# Patient Record
Sex: MALE | Race: WHITE | NOT HISPANIC OR LATINO | Employment: FULL TIME | URBAN - METROPOLITAN AREA
[De-identification: names, ages, dates, MRNs, and addresses within clinical notes are randomized per-mention and may not be internally consistent; named-entity substitution may affect disease eponyms.]

---

## 2022-08-25 ENCOUNTER — OFFICE VISIT (OUTPATIENT)
Dept: INTERNAL MEDICINE | Facility: CLINIC | Age: 32
End: 2022-08-25
Payer: OTHER MISCELLANEOUS

## 2022-08-25 ENCOUNTER — HOSPITAL ENCOUNTER (OUTPATIENT)
Dept: RADIOLOGY | Facility: HOSPITAL | Age: 32
Discharge: HOME | End: 2022-08-25
Attending: PHYSICIAN ASSISTANT
Payer: OTHER MISCELLANEOUS

## 2022-08-25 VITALS
OXYGEN SATURATION: 97 % | HEIGHT: 64 IN | DIASTOLIC BLOOD PRESSURE: 70 MMHG | SYSTOLIC BLOOD PRESSURE: 130 MMHG | RESPIRATION RATE: 12 BRPM | WEIGHT: 185 LBS | HEART RATE: 71 BPM | TEMPERATURE: 98 F | BODY MASS INDEX: 31.58 KG/M2

## 2022-08-25 DIAGNOSIS — Z02.6 ENCOUNTER RELATED TO WORKER'S COMPENSATION CLAIM: ICD-10-CM

## 2022-08-25 DIAGNOSIS — S93.401A INVERSION SPRAIN OF RIGHT ANKLE, INITIAL ENCOUNTER: Primary | ICD-10-CM

## 2022-08-25 DIAGNOSIS — M25.571 ACUTE RIGHT ANKLE PAIN: ICD-10-CM

## 2022-08-25 PROCEDURE — 73610 X-RAY EXAM OF ANKLE: CPT | Mod: TC,PN,RT

## 2022-08-25 PROCEDURE — 80305 DRUG TEST PRSMV DIR OPT OBS: CPT | Mod: QW,S$GLB,, | Performed by: PHYSICIAN ASSISTANT

## 2022-08-25 PROCEDURE — 99204 PR OFFICE/OUTPT VISIT, NEW, LEVL IV, 45-59 MIN: ICD-10-PCS | Mod: S$GLB,,, | Performed by: PHYSICIAN ASSISTANT

## 2022-08-25 PROCEDURE — 73610 XR ANKLE COMPLETE 3 VIEW RIGHT: ICD-10-PCS | Mod: 26,RT,, | Performed by: RADIOLOGY

## 2022-08-25 PROCEDURE — 99204 OFFICE O/P NEW MOD 45 MIN: CPT | Mod: S$GLB,,, | Performed by: PHYSICIAN ASSISTANT

## 2022-08-25 PROCEDURE — 80305 OOH COLLECTION ONLY DRUG SCREEN: ICD-10-PCS | Mod: QW,S$GLB,, | Performed by: PHYSICIAN ASSISTANT

## 2022-08-25 PROCEDURE — 73610 X-RAY EXAM OF ANKLE: CPT | Mod: 26,RT,, | Performed by: RADIOLOGY

## 2022-08-25 RX ORDER — ATORVASTATIN CALCIUM 10 MG/1
10 TABLET, FILM COATED ORAL DAILY
COMMUNITY

## 2022-08-25 NOTE — PROGRESS NOTES
Subjective:       Patient ID: Eliseo Jerry is a 32 y.o. male.    Chief Complaint: Ankle Injury (Right ankle )    NV, DOI: 8/25/2022   SABIC     32-year-old male who was working this morning when he stepped off a platform at 7:00 a.m., twisting his right ankle inward.  Did not feel a pop or crack but had immediate swelling and cannot bear weight at this time.  Has swelling to outer ankle but has pain to outer and inner ankle.  Limited range of motion on flexion-extension of ankle.  No open wounds.  Pain rating is 2/10 at rest without weight bearing and 10/10 when he tries to bear weight.  Had no pain prior to incident today.      Constitution: Negative. Negative for chills, sweating, fatigue and fever.   HENT: Negative.    Neck: neck negative.   Cardiovascular: Negative.  Negative for chest pain.   Eyes: Negative.    Respiratory: Negative.  Negative for chest tightness and shortness of breath.    Gastrointestinal: Negative.    Endocrine: negative.   Genitourinary: Negative.    Musculoskeletal: Positive for pain (R ankle bilateral malleolus pain), joint pain, joint swelling, abnormal ROM of joint (limited plantarflex and dorsiflex due to swelling and pain) and pain with walking (unable to bear weight with ambulation). Negative for trauma.   Skin: Positive for erythema and bruising. Negative for rash and hives.   Allergic/Immunologic: Negative.  Negative for environmental allergies, hives, itching and sneezing.   Neurological: Negative.  Negative for dizziness, light-headedness, disorientation, altered mental status, numbness and tingling.   Hematologic/Lymphatic: Negative.    Psychiatric/Behavioral: Negative.  Negative for altered mental status, disorientation, confusion, agitation and nervous/anxious. The patient is not nervous/anxious.         Objective:      Physical Exam  Vitals and nursing note reviewed.   Constitutional:       General: He is awake. He is not in acute distress.     Appearance: Normal  appearance. He is well-developed and normal weight. He is not ill-appearing, toxic-appearing or diaphoretic.   HENT:      Head: Normocephalic and atraumatic.      Right Ear: External ear normal.      Left Ear: External ear normal.      Nose: Nose normal.      Mouth/Throat:      Mouth: Mucous membranes are moist.   Eyes:      Conjunctiva/sclera: Conjunctivae normal.      Pupils: Pupils are equal, round, and reactive to light.   Cardiovascular:      Rate and Rhythm: Normal rate and regular rhythm.      Pulses: Normal pulses.           Dorsalis pedis pulses are 2+ on the right side and 2+ on the left side.        Posterior tibial pulses are 2+ on the right side and 2+ on the left side.      Heart sounds: Normal heart sounds.   Pulmonary:      Effort: Pulmonary effort is normal.      Breath sounds: Normal breath sounds.   Musculoskeletal:      Cervical back: Normal range of motion and neck supple.      Right foot: Decreased range of motion.        Feet:    Feet:      Right foot:      Skin integrity: Erythema present. No ulcer, blister, skin breakdown, warmth or callus.      Toenail Condition: Right toenails are normal.      Left foot:      Skin integrity: Skin integrity normal.   Skin:     General: Skin is warm and dry.      Findings: Erythema present.   Neurological:      Mental Status: He is alert.   Psychiatric:         Behavior: Behavior is cooperative.        XR ANKLE COMPLETE 3 VIEW RIGHT    Result Date: 8/25/2022  EXAMINATION: XR ANKLE COMPLETE 3 VIEW RIGHT CLINICAL HISTORY: Pain in right ankle and joints of right foot TECHNIQUE: AP, lateral, and oblique images of the right ankle were performed. COMPARISON: None FINDINGS: There is soft tissue swelling at the ankle, particularly laterally.  There is no evidence of a fracture.  The ankle mortise is intact.  A heel spur is noted.     Soft tissue swelling with no acute bony abnormality. Electronically signed by: Bella Marmolejo Date:    08/25/2022  Time:    09:04     Assessment:       1. Inversion sprain of right ankle, initial encounter    2. Acute right ankle pain    3. Encounter related to worker's compensation claim        Plan:       Utilize OTC ibuprofen t.i.d. for discomfort.  Will follow-up in 6 days for reevaluation to determine if there is significant pain, at that time will determine if further referral or treatment is needed.  Patient educated if improvement and swelling goes down with ice over next 48-72 hours he can take splint off and start to wean himself out of it.  Patient verbalized understanding of all education treatment plans     Patient Instructions: Keep dressing clean/dry/covered, Use splint as directed, Elevated affected area, Attention not to aggravate affected area, Apply ice 24-48 hours then apply heat/warm soaks, Use Crutches as directed   Restrictions: Avoid frequent bending/lifting/twisting, No lifting/pushing/pulling more than 10 lbs, No Prolonged standing/walking, Sit or stand as needed, Avoid climbing/kneeling/squatting  Follow up in about 6 days (around 8/31/2022).      There are no Patient Instructions on file for this visit.      JAZMINE Castillo

## 2022-08-25 NOTE — LETTER
[facility]  [address] [place] [address]  Phone: [phone]  Fax: [phone]  [facility] [employer] [facility]: [id number]    Pt Name: Eliseo Esposito Date: 08/25/2022   Employee ID:  Date of First Treatment: 08/25/2022   Company: [employer]      Appointment Time: 08:05 AM Arrived: 7:45am   Provider: JAZMINE Castillo Time Out: 9:35am     Office Treatment:   1. Inversion sprain of right ankle, initial encounter    2. Acute right ankle pain    3. Encounter related to worker's compensation claim          Patient Instructions: Keep dressing clean/dry/covered, Use splint as directed, Elevated affected area, Attention not to aggravate affected area, Apply ice 24-48 hours then apply heat/warm soaks, Use Crutches as directed    Restrictions: Avoid frequent bending/lifting/twisting, No lifting/pushing/pulling more than 10 lbs, No Prolonged standing/walking, Sit or stand as needed, Avoid climbing/kneeling/squatting     Return Appointment: 8/31/2022 at 8:00am

## 2022-08-31 ENCOUNTER — OFFICE VISIT (OUTPATIENT)
Dept: INTERNAL MEDICINE | Facility: CLINIC | Age: 32
End: 2022-08-31
Payer: OTHER MISCELLANEOUS

## 2022-08-31 VITALS
HEIGHT: 64 IN | TEMPERATURE: 97 F | RESPIRATION RATE: 12 BRPM | HEART RATE: 81 BPM | BODY MASS INDEX: 31.58 KG/M2 | OXYGEN SATURATION: 97 % | SYSTOLIC BLOOD PRESSURE: 146 MMHG | WEIGHT: 185 LBS | DIASTOLIC BLOOD PRESSURE: 79 MMHG

## 2022-08-31 DIAGNOSIS — M25.571 ACUTE RIGHT ANKLE PAIN: ICD-10-CM

## 2022-08-31 DIAGNOSIS — Z02.6 ENCOUNTER RELATED TO WORKER'S COMPENSATION CLAIM: ICD-10-CM

## 2022-08-31 DIAGNOSIS — S93.401A INVERSION SPRAIN OF RIGHT ANKLE, INITIAL ENCOUNTER: Primary | ICD-10-CM

## 2022-08-31 PROCEDURE — 99214 OFFICE O/P EST MOD 30 MIN: CPT | Mod: S$GLB,,, | Performed by: PHYSICIAN ASSISTANT

## 2022-08-31 PROCEDURE — 99214 PR OFFICE/OUTPT VISIT, EST, LEVL IV, 30-39 MIN: ICD-10-PCS | Mod: S$GLB,,, | Performed by: PHYSICIAN ASSISTANT

## 2022-08-31 NOTE — PROGRESS NOTES
Subjective:       Patient ID: Eliseo Jerry is a 32 y.o. male.    Chief Complaint: Ankle Injury    RV, DOI: 8/25/2022   SABIC     32-year-old male here for follow-up from right ankle.  Weaned himself out of the CAM walker 3 days ago, able to walk and bear weight without significant pain.  Current pain 2/10 to lateral malleolus and medial malleolus.  Has bruising along the lateral aspect of his ankle.  Has tenderness with prolonged standing, still able to perform all daily activities with discomfort.  Has been taken ibuprofen p.r.n..      Constitution: Negative. Negative for chills, sweating, fatigue and fever.   HENT: Negative.     Neck: neck negative.   Cardiovascular: Negative.  Negative for chest pain.   Eyes: Negative.    Respiratory: Negative.  Negative for chest tightness and shortness of breath.    Gastrointestinal: Negative.    Endocrine: negative.   Genitourinary: Negative.    Musculoskeletal:  Positive for pain (R ankle bilateral malleolus pain), joint pain, joint swelling, abnormal ROM of joint (limited plantarflex and dorsiflex due to swelling and pain) and pain with walking (unable to bear weight with ambulation). Negative for trauma.   Skin:  Positive for bruising. Negative for rash, erythema and hives.   Allergic/Immunologic: Negative.  Negative for environmental allergies, hives, itching and sneezing.   Neurological: Negative.  Negative for dizziness, light-headedness, disorientation, altered mental status, numbness and tingling.   Hematologic/Lymphatic: Negative.    Psychiatric/Behavioral: Negative.  Negative for altered mental status, disorientation, confusion, agitation and nervous/anxious. The patient is not nervous/anxious.       Objective:      Physical Exam  Vitals and nursing note reviewed.   Constitutional:       General: He is awake. He is not in acute distress.     Appearance: Normal appearance. He is well-developed and normal weight. He is not ill-appearing, toxic-appearing or  diaphoretic.   HENT:      Head: Normocephalic and atraumatic.      Right Ear: External ear normal.      Left Ear: External ear normal.      Nose: Nose normal.      Mouth/Throat:      Mouth: Mucous membranes are moist.   Eyes:      Conjunctiva/sclera: Conjunctivae normal.      Pupils: Pupils are equal, round, and reactive to light.   Cardiovascular:      Rate and Rhythm: Normal rate and regular rhythm.      Pulses: Normal pulses.           Dorsalis pedis pulses are 2+ on the right side and 2+ on the left side.        Posterior tibial pulses are 2+ on the right side and 2+ on the left side.      Heart sounds: Normal heart sounds.   Pulmonary:      Effort: Pulmonary effort is normal.      Breath sounds: Normal breath sounds.   Musculoskeletal:      Cervical back: Normal range of motion and neck supple.      Right foot: Decreased range of motion (limited beyond 45 degrees due to tenderness at ankle).        Feet:    Feet:      Right foot:      Skin integrity: Erythema present. No ulcer, blister, skin breakdown, warmth or callus.      Toenail Condition: Right toenails are normal.      Left foot:      Skin integrity: Skin integrity normal.   Skin:     General: Skin is warm and dry.      Findings: No erythema.   Neurological:      Mental Status: He is alert.   Psychiatric:         Behavior: Behavior is cooperative.       Assessment:       1. Inversion sprain of right ankle, initial encounter    2. Encounter related to worker's compensation claim    3. Acute right ankle pain          Plan:       Patient has 1 week off of work, he desired to return to work today without restrictions but still has pain and discomfort with ambulating and prolonged standing.  Discuss being able to do a telephone visit in 1 week to evaluate if he is good to return to work, if no issues I will return him to full duty and discharge him from occupational health..  If he is not able to return to work without restrictions he will come in for formal  evaluation.  He will continue to ambulate and do stretches on ankle to enhance his flexibility and stamina for prolonged standing     Patient Instructions: Attention not to aggravate affected area, Elevated affected area   Restrictions: No lifting/pushing/pulling more than 25 lbs, Avoid climbing/kneeling/squatting, No Prolonged standing/walking  Follow up in about 6 days (around 9/6/2022).      There are no Patient Instructions on file for this visit.      JAZMINE aCstillo

## 2022-08-31 NOTE — LETTER
[facility]  [address] [place] [facility] [place] [address]  Phone: [phone]  Fax: [phone]  [facility] [employer]: [id number]    Pt Name: Eliseo Jerry  Injury Date: 08/25/2022   Employee ID:  Date of First Treatment: 08/31/2022   Company: [employer]      Appointment Time: 07:45 AM Arrived: 7:30am   Provider: JAZMINE Castillo Time Out:8:10am     Office Treatment:   1. Inversion sprain of right ankle, initial encounter    2. Encounter related to worker's compensation claim    3. Acute right ankle pain          Patient Instructions: Attention not to aggravate affected area, Elevated affected area    Restrictions: No lifting/pushing/pulling more than 25 lbs, Avoid climbing/kneeling/squatting, No Prolonged standing/walking     Return Appointment: 6Sept@1:00pm